# Patient Record
Sex: MALE | Race: BLACK OR AFRICAN AMERICAN | Employment: UNEMPLOYED | URBAN - METROPOLITAN AREA
[De-identification: names, ages, dates, MRNs, and addresses within clinical notes are randomized per-mention and may not be internally consistent; named-entity substitution may affect disease eponyms.]

---

## 2018-04-18 ENCOUNTER — HOSPITAL ENCOUNTER (EMERGENCY)
Age: 53
Discharge: HOME | End: 2018-04-19
Attending: EMERGENCY MEDICINE | Admitting: EMERGENCY MEDICINE
Payer: MEDICAID

## 2018-04-18 VITALS
OXYGEN SATURATION: 96 % | DIASTOLIC BLOOD PRESSURE: 96 MMHG | SYSTOLIC BLOOD PRESSURE: 141 MMHG | HEIGHT: 68 IN | TEMPERATURE: 98.6 F | BODY MASS INDEX: 27.28 KG/M2 | WEIGHT: 180 LBS | RESPIRATION RATE: 18 BRPM | HEART RATE: 112 BPM

## 2018-04-18 DIAGNOSIS — F10.10 ALCOHOL ABUSE: Primary | ICD-10-CM

## 2018-04-18 LAB
AMPHET UR QL SCN: NEGATIVE
ANION GAP SERPL CALC-SCNC: 14 MMOL/L (ref 5–15)
APPEARANCE UR: CLEAR
BACTERIA URNS QL MICRO: NEGATIVE /HPF
BARBITURATES UR QL SCN: NEGATIVE
BASOPHILS # BLD: 0 K/UL (ref 0–0.1)
BASOPHILS NFR BLD: 0 % (ref 0–1)
BENZODIAZ UR QL: NEGATIVE
BILIRUB UR QL: NEGATIVE
BUN SERPL-MCNC: 15 MG/DL (ref 6–20)
BUN/CREAT SERPL: 15 (ref 12–20)
CALCIUM SERPL-MCNC: 9.4 MG/DL (ref 8.5–10.1)
CANNABINOIDS UR QL SCN: NEGATIVE
CHLORIDE SERPL-SCNC: 107 MMOL/L (ref 97–108)
CO2 SERPL-SCNC: 21 MMOL/L (ref 21–32)
COCAINE UR QL SCN: NEGATIVE
COLOR UR: NORMAL
CREAT SERPL-MCNC: 1.03 MG/DL (ref 0.7–1.3)
DIFFERENTIAL METHOD BLD: ABNORMAL
DRUG SCRN COMMENT,DRGCM: NORMAL
EOSINOPHIL # BLD: 0 K/UL (ref 0–0.4)
EOSINOPHIL NFR BLD: 0 % (ref 0–7)
EPITH CASTS URNS QL MICRO: NORMAL /LPF
ERYTHROCYTE [DISTWIDTH] IN BLOOD BY AUTOMATED COUNT: 14.6 % (ref 11.5–14.5)
ETHANOL SERPL-MCNC: 194 MG/DL
GLUCOSE SERPL-MCNC: 101 MG/DL (ref 65–100)
GLUCOSE UR STRIP.AUTO-MCNC: NEGATIVE MG/DL
HCT VFR BLD AUTO: 47.6 % (ref 36.6–50.3)
HGB BLD-MCNC: 15.4 G/DL (ref 12.1–17)
HGB UR QL STRIP: NEGATIVE
IMM GRANULOCYTES # BLD: 0 K/UL (ref 0–0.04)
IMM GRANULOCYTES NFR BLD AUTO: 0 % (ref 0–0.5)
KETONES UR QL STRIP.AUTO: NEGATIVE MG/DL
LEUKOCYTE ESTERASE UR QL STRIP.AUTO: NEGATIVE
LYMPHOCYTES # BLD: 1.8 K/UL (ref 0.8–3.5)
LYMPHOCYTES NFR BLD: 32 % (ref 12–49)
MCH RBC QN AUTO: 27 PG (ref 26–34)
MCHC RBC AUTO-ENTMCNC: 32.4 G/DL (ref 30–36.5)
MCV RBC AUTO: 83.4 FL (ref 80–99)
METHADONE UR QL: NEGATIVE
MONOCYTES # BLD: 0.4 K/UL (ref 0–1)
MONOCYTES NFR BLD: 8 % (ref 5–13)
NEUTS SEG # BLD: 3.3 K/UL (ref 1.8–8)
NEUTS SEG NFR BLD: 59 % (ref 32–75)
NITRITE UR QL STRIP.AUTO: NEGATIVE
NRBC # BLD: 0 K/UL (ref 0–0.01)
NRBC BLD-RTO: 0 PER 100 WBC
OPIATES UR QL: NEGATIVE
PCP UR QL: NEGATIVE
PH UR STRIP: 5.5 [PH] (ref 5–8)
PLATELET # BLD AUTO: 208 K/UL (ref 150–400)
PMV BLD AUTO: 10 FL (ref 8.9–12.9)
POTASSIUM SERPL-SCNC: 3.6 MMOL/L (ref 3.5–5.1)
PROT UR STRIP-MCNC: NEGATIVE MG/DL
RBC # BLD AUTO: 5.71 M/UL (ref 4.1–5.7)
RBC #/AREA URNS HPF: NORMAL /HPF (ref 0–5)
SODIUM SERPL-SCNC: 142 MMOL/L (ref 136–145)
SP GR UR REFRACTOMETRY: <1.005 (ref 1–1.03)
UA: UC IF INDICATED,UAUC: NORMAL
UROBILINOGEN UR QL STRIP.AUTO: 0.2 EU/DL (ref 0.2–1)
WBC # BLD AUTO: 5.6 K/UL (ref 4.1–11.1)
WBC URNS QL MICRO: NORMAL /HPF (ref 0–4)

## 2018-04-18 PROCEDURE — 81001 URINALYSIS AUTO W/SCOPE: CPT | Performed by: EMERGENCY MEDICINE

## 2018-04-18 PROCEDURE — 80307 DRUG TEST PRSMV CHEM ANLYZR: CPT | Performed by: EMERGENCY MEDICINE

## 2018-04-18 PROCEDURE — 80048 BASIC METABOLIC PNL TOTAL CA: CPT | Performed by: EMERGENCY MEDICINE

## 2018-04-18 PROCEDURE — 99283 EMERGENCY DEPT VISIT LOW MDM: CPT

## 2018-04-18 PROCEDURE — 85025 COMPLETE CBC W/AUTO DIFF WBC: CPT | Performed by: EMERGENCY MEDICINE

## 2018-04-18 PROCEDURE — 36415 COLL VENOUS BLD VENIPUNCTURE: CPT | Performed by: EMERGENCY MEDICINE

## 2018-04-18 RX ORDER — ARIPIPRAZOLE 10 MG/1
10 TABLET ORAL DAILY
COMMUNITY

## 2018-04-19 NOTE — ED NOTES
Spoke with CELINA. Updated on pt. They advised they would be here in about tow hours to reevaluate pt.

## 2018-04-19 NOTE — ED NOTES
Patient given copy of dc instructions. Patient verbalized understanding of instructions. Patient given a current medication reconciliation form and verbalized understanding of their medications. Patient verbalized understanding of the importance of discussing medications with  his or her physician or clinic when they follow up. Patient alert and oriented and in no acute distress. Pt verbalizes pain scale of 0 out of 10. Patient discharged home without assistance. Wheelchair was declined.

## 2018-04-19 NOTE — ED NOTES
Per RPCLEMENT, pt was at his group home. Pt was intoxicated. Manager called MANDO. She advised the officer that the pt was a danger to himself and other. The officer assessed the situation and did not feel the pt required ECO. Manager then wanted the pt arrested for being intoxicated. The officer sts that he advised that the pt was in his own residence and could not arrest him for being intoxicated. The officer advised this RN that the manager started to complain and stated that she was going to call various state and local agencies and officials to complain. The officer sts that the decision was mad to call Balbir Parson, who aggred with the officer. Finally the decision was made to ECO the pt to remove him from the situation. It pt sts that he had been drinking today and that he is intoxicated and just wanted to go to bed, Pt is cooperative. Answers questions appropriately. Pt is unsteady on his feet and smells of alcoholic beverages. Emergency Department Nursing Plan of Care       The Nursing Plan of Care is developed from the Nursing assessment and Emergency Department Attending provider initial evaluation. The plan of care may be reviewed in the ED Provider note.     The Plan of Care was developed with the following considerations:   Patient / Family readiness to learn indicated by:verbalized understanding  Persons(s) to be included in education: patient  Barriers to Learning/Limitations:No    Signed     Amanda Guadarrama RN    4/18/2018   11:17 PM
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
## 2018-04-19 NOTE — DISCHARGE INSTRUCTIONS
Acute Alcohol Intoxication: Care Instructions  Your Care Instructions    You have had treatment to help your body rid itself of alcohol. Too much alcohol upsets the body's fluid balance. Your doctor may have given you fluids and vitamins. For some people, drinking too much alcohol is a one-time event. For others, it is an ongoing problem. In either case, it is serious. It can be life-threatening. Follow-up care is a key part of your treatment and safety. Be sure to make and go to all appointments, and call your doctor if you are having problems. It's also a good idea to know your test results and keep a list of the medicines you take. How can you care for yourself at home? · Do not drink and drive. · Be safe with medicines. Take your medicines exactly as prescribed. Call your doctor if you think you are having a problem with your medicine. · Your doctor may have prescribed disulfiram (Antabuse). Do not drink any alcohol while you are taking this medicine. You may have severe or even life-threatening side effects from even small amounts of alcohol. · If you were given medicine to prevent nausea, be sure to take it exactly as prescribed. · Before you take any medicine, tell your doctor if:  ¨ You have had a bad reaction to any medicines in the past.  ¨ You are taking other medicines, including over-the-counter ones, or have other health problems. ¨ You are or could be pregnant. · Be prepared to have some symptoms of withdrawal in the next few days. · Drink plenty of liquids in the next few days. · Seek help if you need it to stop drinking. Getting counseling and joining a support group can help you stay sober. Try a support group such as Alcoholics Anonymous. · Avoid alcohol when you take medicines. It can react with many medicines and cause serious problems. When should you call for help? Call 911 anytime you think you may need emergency care.  For example, call if:  ? · You feel confused and are seeing things that are not there. ? · You are thinking about killing yourself or hurting others. ? · You have a seizure. ? · You vomit blood or what looks like coffee grounds. ?Call your doctor now or seek immediate medical care if:  ? · You have trembling, restlessness, sweating, and other withdrawal symptoms that are new or that get worse. ? · Your withdrawal symptoms come back after not bothering you for days or weeks. ? · You can't stop vomiting. ? Watch closely for changes in your health, and be sure to contact your doctor if:  ? · You need help to stop drinking. Where can you learn more? Go to http://mel-haroon.info/. Enter T102 in the search box to learn more about \"Acute Alcohol Intoxication: Care Instructions. \"  Current as of: November 3, 2016  Content Version: 11.4  © 0131-9209 [employer]. Care instructions adapted under license by [employer] (which disclaims liability or warranty for this information). If you have questions about a medical condition or this instruction, always ask your healthcare professional. [address] any warranty or liability for your use of this information. Acute Alcohol Intoxication: Care Instructions  Your Care Instructions    You have had treatment to help your body rid itself of alcohol. Too much alcohol upsets the body's fluid balance. Your doctor may have given you fluids and vitamins. For some people, drinking too much alcohol is a one-time event. For others, it is an ongoing problem. In either case, it is serious. It can be life-threatening. Follow-up care is a key part of your treatment and safety. Be sure to make and go to all appointments, and call your doctor if you are having problems. It's also a good idea to know your test results and keep a list of the medicines you take. How can you care for yourself at home? · Do not drink and drive. · Be safe with medicines.  Take your medicines exactly as prescribed. Call your doctor if you think you are having a problem with your medicine. · Your doctor may have prescribed disulfiram (Antabuse). Do not drink any alcohol while you are taking this medicine. You may have severe or even life-threatening side effects from even small amounts of alcohol. · If you were given medicine to prevent nausea, be sure to take it exactly as prescribed. · Before you take any medicine, tell your doctor if:  ¨ You have had a bad reaction to any medicines in the past.  ¨ You are taking other medicines, including over-the-counter ones, or have other health problems. ¨ You are or could be pregnant. · Be prepared to have some symptoms of withdrawal in the next few days. · Drink plenty of liquids in the next few days. · Seek help if you need it to stop drinking. Getting counseling and joining a support group can help you stay sober. Try a support group such as Alcoholics Anonymous. · Avoid alcohol when you take medicines. It can react with many medicines and cause serious problems. When should you call for help? Call 911 anytime you think you may need emergency care. For example, call if:  ? · You feel confused and are seeing things that are not there. ? · You are thinking about killing yourself or hurting others. ? · You have a seizure. ? · You vomit blood or what looks like coffee grounds. ?Call your doctor now or seek immediate medical care if:  ? · You have trembling, restlessness, sweating, and other withdrawal symptoms that are new or that get worse. ? · Your withdrawal symptoms come back after not bothering you for days or weeks. ? · You can't stop vomiting. ? Watch closely for changes in your health, and be sure to contact your doctor if:  ? · You need help to stop drinking. Where can you learn more? Go to http://mel-haroon.info/.   Enter T102 in the search box to learn more about \"Acute Alcohol Intoxication: Care Instructions. \"  Current as of: November 3, 2016  Content Version: 11.4  © 7995-4900 [employer] Care instructions adapted under license by [email or web address] (which disclaims liability or warranty for this information). If you have questions about a medical condition or this instruction, always ask your healthcare professional. [place] [address] any warranty or liability for your use of this information.

## 2022-06-20 ENCOUNTER — TELEPHONE (OUTPATIENT)
Dept: UROLOGY | Age: 57
End: 2022-06-20

## 2022-06-20 NOTE — TELEPHONE ENCOUNTER
Dr. Daron Philip needed to make a new patient appt for elevated PSA 21.63.  He is in a group home [facility] place

## 2022-11-23 ENCOUNTER — OFFICE VISIT (OUTPATIENT)
Dept: UROLOGY | Age: 57
End: 2022-11-23
Payer: COMMERCIAL

## 2022-11-23 VITALS
HEART RATE: 73 BPM | DIASTOLIC BLOOD PRESSURE: 93 MMHG | SYSTOLIC BLOOD PRESSURE: 144 MMHG | HEIGHT: 68 IN | BODY MASS INDEX: 27.58 KG/M2 | WEIGHT: 182 LBS

## 2022-11-23 DIAGNOSIS — N40.3 NODULAR PROSTATE WITH LOWER URINARY TRACT SYMPTOMS: ICD-10-CM

## 2022-11-23 DIAGNOSIS — R30.0 DYSURIA: ICD-10-CM

## 2022-11-23 DIAGNOSIS — R97.20 ELEVATED PSA: Primary | ICD-10-CM

## 2022-11-23 LAB
BILIRUB UR QL: NEGATIVE
GLUCOSE UR-MCNC: NEGATIVE MG/DL
KETONES P FAST UR STRIP-MCNC: NEGATIVE MG/DL
PH UR STRIP: 6.5 [PH] (ref 4.6–8)
PROT UR QL STRIP: NEGATIVE
PVR POC: NORMAL CC
SP GR UR STRIP: 1.02 (ref 1–1.03)
UA UROBILINOGEN AMB POC: NORMAL (ref 0.2–1)
URINALYSIS CLARITY POC: CLEAR
URINALYSIS COLOR POC: YELLOW
URINE BLOOD POC: NEGATIVE
URINE LEUKOCYTES POC: NEGATIVE
URINE NITRITES POC: NEGATIVE

## 2022-11-23 RX ORDER — CIPROFLOXACIN 500 MG/1
500 TABLET ORAL 2 TIMES DAILY
Qty: 4 TABLET | Refills: 0 | Status: SHIPPED | OUTPATIENT
Start: 2022-11-23

## 2022-11-23 NOTE — PROGRESS NOTES
HISTORY OF PRESENT ILLNESS  Devika Ramirez is a 59 y.o. male. Chief Complaint   Patient presents with    Elevated PSA    New Patient     HPI patient lives in a group home unable to give a much information. Avoid at this point he has a elevated PSA of 21.9, no voiding symptoms he says when I talk to him. Do not know if he has a weight loss or any other symptoms. Review of systems unable to be obtained  Chronic Conditions Addressed Today       1. Elevated PSA - Primary     Relevant Medications     ciprofloxacin HCl (CIPRO) 500 mg tablet     Other Relevant Orders     AMB POC URINALYSIS DIP STICK AUTO W/O MICRO (Completed)     AMB POC PVR, CHRISTOPH,POST-VOID RES,US,NON-IMAGING (Completed)     PSA, DIAGNOSTIC (PROSTATE SPECIFIC AG) (Completed)    2. Nodular prostate with lower urinary tract symptoms     Relevant Orders     AMB POC URINALYSIS DIP STICK AUTO W/O MICRO (Completed)     AMB POC PVR, CHRISTOPH,POST-VOID RES,US,NON-IMAGING (Completed)    3. Dysuria     Relevant Orders     AMB POC URINALYSIS DIP STICK AUTO W/O MICRO (Completed)     CULTURE, URINE (Completed)     AMB POC PVR, CHRISTOPH,POST-VOID RES,US,NON-IMAGING (Completed)          Patient denies the symptoms of COVID-19 per routine screening guidelines. ROS  Un Able to be obtained-he may have some mild dysuria otherwise unremarkable according to patient and staff  Past Medical History:  PMHx (including negatives):  has a past medical history of Psychiatric disorder. PSurgHx:  has no past surgical history on file. PSocHx:  reports that he has never smoked. He uses smokeless tobacco. He reports current alcohol use. He reports that he does not use drugs. Home Medications    Medication Sig Start Date End Date Taking? Authorizing Provider   ciprofloxacin HCl (CIPRO) 500 mg tablet Take 1 Tablet by mouth two (2) times a day. 11/23/22  Yes Kayla Ramirez MD   ARIPiprazole (ABILIFY) 10 mg tablet Take 10 mg by mouth daily.     Other, MD Emile      Physical ExamConstitutional: He is oriented to person, place, and time. He appears well-developed and well-nourished. No distress. Intoxicated. No physical distress HENT:   Head: Normocephalic and atraumatic. Mouth/Throat: Oropharynx is clear and moist. No oropharyngeal exudate or posterior oropharyngeal erythema. Neck: Normal range of motion and full passive range of motion without pain. Neck supple. Cardiovascular: Normal rate, regular rhythm, normal heart sounds, intact distal pulses and normal pulses. Exam reveals no gallop and no friction rub. No murmur heard. Pulmonary/Chest: Effort normal and breath sounds normal. No accessory muscle usage. No respiratory distress. He has no decreased breath sounds. He has no wheezes. He has no rhonchi. He has no rales. Abdominal: Soft. Bowel sounds are normal. He exhibits no distension. There is no tenderness. There is no rebound, no guarding and no CVA tenderness. Musculoskeletal: Normal range of motion. He exhibits no edema or tenderness. Thoracic back: He exhibits no tenderness and no bony tenderness. Lumbar back: He exhibits no tenderness and no bony tenderness. Lymphadenopathy:     He has no cervical adenopathy. Neurological: He is alert and oriented to person, place, and time. He has normal strength. He is not disoriented. No cranial nerve deficit or sensory deficit. No focal deficits; 5/5 muscle strength in all extremities. Skin: Skin is warm. No lesion and no rash noted. Rash is not nodular. He is not diaphoretic. Psychiatric:   Very agitated. Exhibits anti-social behavior but cooperative to general commands   Nursing note and vitals reviewed. Rectal exam  showed a very nodular prostate nontender suspicious for cancer  ASSESSMENT and PLAN  Diagnoses and all orders for this visit:    1. Elevated PSA  -     AMB POC URINALYSIS DIP STICK AUTO W/O MICRO  -     ciprofloxacin HCl (CIPRO) 500 mg tablet;  Take 1 Tablet by mouth two (2) times a day.  -     AMB POC PVR, CHRISTOPH,POST-VOID RES,US,NON-IMAGING  -     PSA, DIAGNOSTIC (PROSTATE SPECIFIC AG)    2. Nodular prostate with lower urinary tract symptoms  -     AMB POC URINALYSIS DIP STICK AUTO W/O MICRO  -     AMB POC PVR, CHRISTOPH,POST-VOID RES,US,NON-IMAGING    3.  Dysuria  -     AMB POC URINALYSIS DIP STICK AUTO W/O MICRO  -     CULTURE, URINE  -     AMB POC PVR, CHRISTOPH,POST-VOID RES,US,NON-IMAGING             Sagrario Tian MD

## 2022-11-23 NOTE — PROGRESS NOTES
Chief Complaint   Patient presents with    Elevated PSA    New Patient       1. Have you been to the ER, urgent care clinic since your last visit? Hospitalized since your last visit? 2. Have you seen or consulted any other health care providers outside of the [address] [place] [address] since your last visit? Include any pap smears or colon screening.        Visit Vitals  BP (!) 144/93 (BP 1 Location: Left upper arm, BP Patient Position: Sitting, BP Cuff Size: Adult long)   Pulse 73   Ht 5' 8\" (1.727 m)   Wt 182 lb (82.6 kg)   BMI 27.67 kg/m²

## 2022-11-24 LAB — PSA SERPL-MCNC: 21 NG/ML (ref 0–4)

## 2022-11-26 LAB — BACTERIA UR CULT: NORMAL

## 2022-12-13 ENCOUNTER — TELEPHONE (OUTPATIENT)
Dept: UROLOGY | Age: 57
End: 2022-12-13

## 2022-12-13 NOTE — TELEPHONE ENCOUNTER
Dr. Champagne Ion patient coming Friday for biopsy. Lives in a group home and does not communicate. Should have a POA that will need to sign consent. Please coordinate prior to Friday.

## 2022-12-19 NOTE — TELEPHONE ENCOUNTER
Spoke with pt caregiver she states pt does not want to come here at all again and does not want any doctor touching him